# Patient Record
Sex: MALE | Race: WHITE | NOT HISPANIC OR LATINO
[De-identification: names, ages, dates, MRNs, and addresses within clinical notes are randomized per-mention and may not be internally consistent; named-entity substitution may affect disease eponyms.]

---

## 2018-01-15 ENCOUNTER — TRANSCRIPTION ENCOUNTER (OUTPATIENT)
Age: 83
End: 2018-01-15

## 2019-08-26 ENCOUNTER — TRANSCRIPTION ENCOUNTER (OUTPATIENT)
Age: 84
End: 2019-08-26

## 2020-01-29 ENCOUNTER — TRANSCRIPTION ENCOUNTER (OUTPATIENT)
Age: 85
End: 2020-01-29

## 2020-05-31 ENCOUNTER — TRANSCRIPTION ENCOUNTER (OUTPATIENT)
Age: 85
End: 2020-05-31

## 2021-12-16 ENCOUNTER — TRANSCRIPTION ENCOUNTER (OUTPATIENT)
Age: 86
End: 2021-12-16

## 2022-03-15 PROBLEM — Z00.00 ENCOUNTER FOR PREVENTIVE HEALTH EXAMINATION: Status: ACTIVE | Noted: 2022-03-15

## 2022-03-17 ENCOUNTER — APPOINTMENT (OUTPATIENT)
Dept: GASTROENTEROLOGY | Facility: CLINIC | Age: 87
End: 2022-03-17
Payer: MEDICARE

## 2022-03-17 VITALS
DIASTOLIC BLOOD PRESSURE: 60 MMHG | BODY MASS INDEX: 22.22 KG/M2 | SYSTOLIC BLOOD PRESSURE: 110 MMHG | WEIGHT: 150 LBS | HEIGHT: 69 IN | HEART RATE: 69 BPM | OXYGEN SATURATION: 99 % | TEMPERATURE: 97.7 F

## 2022-03-17 DIAGNOSIS — C88.0 WALDENSTROM MACROGLOBULINEMIA: ICD-10-CM

## 2022-03-17 DIAGNOSIS — R19.8 OTHER SPECIFIED SYMPTOMS AND SIGNS INVOLVING THE DIGESTIVE SYSTEM AND ABDOMEN: ICD-10-CM

## 2022-03-17 PROCEDURE — 82270 OCCULT BLOOD FECES: CPT

## 2022-03-17 PROCEDURE — 99202 OFFICE O/P NEW SF 15 MIN: CPT

## 2022-03-17 RX ORDER — DOCUSATE SODIUM 100 MG/1
TABLET ORAL
Refills: 0 | Status: ACTIVE | COMMUNITY

## 2022-03-17 NOTE — HISTORY OF PRESENT ILLNESS
[FreeTextEntry1] : I saw your patient Ariel Becerra  in the office today.  The patient is a 89-year-old male who generally enjoys good health.  He has not been diagnosed with any hypertension diabetes or coronary artery disease and his appetite is fair to good with no dysphagia.  The patient has a gradual weight loss over 2 years due to decreased diet and the loss of his spouse.  The patient has been diagnosed with Waldenstrom's macroglobulinemia and will be starting a new medication in the near future.  The patient's bowel movements are usually normal but he currently has noticed a subtle change where he may skip a day.  He has to rarely force to have a bowel movement and there is no blood in the stool or on the toilet tissue.  There is no abdominal pain.  The patient drinks copious amounts of water but his son, who accompanied him, states that his fiber intake has decreased since he is cooking for himself.  The patient does not abuse tobacco caffeine or ethanol.

## 2022-03-17 NOTE — ASSESSMENT
[FreeTextEntry1] : The patient is a 89-year-old male who generally enjoys good health.  He has been followed for Waldenstrom's  and will be starting a medication since his protein levels have been rising.  The patient has not had any thromboembolic episodes.  The patient notices a subtle change in his bowel habits which technically is not constipation.  Patient usually goes every day to every other day with a subtle change in the bowel consistency and occasionally the need to strain.  Today's rectal reveals a copious amount of stool in the rectal vault but no impaction.  This indicates that there is likely no proximal pathology or obstruction and the stool was guaiac negative.  The patient has been relatively inactive and has decreased his fiber intake.  I instructed the patient to start soluble fiber supplements and increase his vegetable intake.  I do not feel he requires invasive work-up at the present time however if the symptoms do escalate I will send him for a CAT scan of the abdomen and pelvis to check for a  tight diverticular segment or other pathology.  Plan was discussed in detail with the patient and his son who was present.

## 2022-03-17 NOTE — PHYSICAL EXAM
[General Appearance - Alert] : alert [General Appearance - In No Acute Distress] : in no acute distress [General Appearance - Well Nourished] : well nourished [General Appearance - Well Developed] : well developed [Respiration, Rhythm And Depth] : normal respiratory rhythm and effort [Apical Impulse] : the apical impulse was normal [Heart Rate And Rhythm] : heart rate was normal and rhythm regular [Heart Sounds] : normal S1 and S2 [Bowel Sounds] : normal bowel sounds [Abdomen Soft] : soft [Abdomen Tenderness] : non-tender [] : no hepato-splenomegaly [Normal Sphincter Tone] : normal sphincter tone [No Rectal Mass] : no rectal mass [Occult Blood Positive] : stool was negative for occult blood